# Patient Record
Sex: FEMALE | Race: WHITE | NOT HISPANIC OR LATINO | Employment: OTHER | URBAN - METROPOLITAN AREA
[De-identification: names, ages, dates, MRNs, and addresses within clinical notes are randomized per-mention and may not be internally consistent; named-entity substitution may affect disease eponyms.]

---

## 2018-12-18 LAB
A1C: 8.5
CHOLEST SERPL-MSCNC: 87 MG/DL (ref 0–200)
HDLC SERPL-MCNC: 37 MG/DL
LDLC SERPL CALC-MCNC: 37 MG/DL
TRIGL SERPL-MCNC: 82 MG/DL

## 2020-01-03 ENCOUNTER — PATIENT OUTREACH (OUTPATIENT)
Dept: ADMINISTRATIVE | Facility: HOSPITAL | Age: 60
End: 2020-01-03

## 2020-01-03 NOTE — LETTER
January 3, 2020    Niya Brock  [address]  [place] [id number]             [facility]  [address] [address]  Phone: [phone] Dear Niya Brock    [facility] is committed to your overall health.  To help you get the most out of each of your visits, we will review your information to make sure you are up to date on all of your recommended tests and/or procedures.      As a new patient to [facility] , we may not have your complete medical records.  He has found that your chart shows you may be due for:  Health Maintenance Due   Topic Date Due    Hepatitis C Screening  1960    Foot Exam  05/08/1971    Eye Exam  05/08/1971    Urine Microalbumin  05/08/1971    TETANUS VACCINE  05/08/1979    Pneumococcal Vaccine (Medium Risk) (1 of 1 - PPSV23) 05/08/1980    Low Dose Statin  05/08/1982    Mammogram  05/08/2001    Shingles Vaccine (1 of 2) 05/08/2011    Colonoscopy  05/08/2011    Hemoglobin A1c  03/18/2019    Influenza Vaccine (1) 09/01/2019    Lipid Panel  12/18/2019   If you have had any of the above done at another facility, please bring the records or information with you so that your record at [facility] will be complete.      If you are currently taking medication, please bring it with you to your appointment for review.    Also, if you have any type of Advanced Directives, please bring them with you to your office visit so we may scan them into your chart.    Thank You,  Your [facility] Team  Ana Hutchinson L.P.N. Clinical Care Coordinator  [address] [place] [address], [place] [address] [phone]